# Patient Record
Sex: FEMALE | Race: WHITE | NOT HISPANIC OR LATINO | URBAN - METROPOLITAN AREA
[De-identification: names, ages, dates, MRNs, and addresses within clinical notes are randomized per-mention and may not be internally consistent; named-entity substitution may affect disease eponyms.]

---

## 2020-07-25 ENCOUNTER — TELEPHONE ENCOUNTER (OUTPATIENT)
Dept: URBAN - METROPOLITAN AREA CLINIC 13 | Facility: CLINIC | Age: 75
End: 2020-07-25

## 2020-07-25 RX ORDER — METHYLPREDNISOLONE 4 MG/1
TAKE 1 TABLET DAILY TABLET ORAL
Refills: 0 | OUTPATIENT
End: 2017-06-29

## 2020-07-25 RX ORDER — SIMVASTATIN 20 MG/1
TAKE 1 TABLET DAILY TABLET, FILM COATED ORAL
Refills: 0 | OUTPATIENT
End: 2017-08-01

## 2020-07-25 RX ORDER — OMEPRAZOLE 40 MG/1
TAKE 1 CAPSULE BY MOUTH  DAILY CAPSULE, DELAYED RELEASE ORAL
Qty: 90 | Refills: 0 | OUTPATIENT
End: 2017-08-01

## 2020-07-25 RX ORDER — POLYETHYLENE GLYCOL 3350, SODIUM CHLORIDE, SODIUM BICARBONATE AND POTASSIUM CHLORIDE WITH LEMON FLAVOR 420; 11.2; 5.72; 1.48 G/4L; G/4L; G/4L; G/4L
DRINK 8OZ GLASS EVERY 10-15 MINUTES UNTIL YOU HAVE CONSUMED YOUR FIRST HALF OF SOLUTION. REPEAT AGAIN 6 HOURS PRIOR TO PROCEDURE POWDER, FOR SOLUTION ORAL
Qty: 1 | Refills: 0 | OUTPATIENT
Start: 2017-06-26 | End: 2017-06-29

## 2020-07-25 RX ORDER — DEXAMETHASONE 4 MG/1
TAKE 1 TABLET DAILY TABLET ORAL
Refills: 0 | OUTPATIENT
End: 2017-08-01

## 2020-07-26 ENCOUNTER — TELEPHONE ENCOUNTER (OUTPATIENT)
Dept: URBAN - METROPOLITAN AREA CLINIC 13 | Facility: CLINIC | Age: 75
End: 2020-07-26

## 2020-07-26 RX ORDER — LISINOPRIL 10 MG/1
TAKE 1 TABLET DAILY TABLET ORAL
Refills: 0 | Status: ACTIVE | COMMUNITY

## 2020-07-26 RX ORDER — GLIPIZIDE 10 MG/1
TAKE 1 TABLET DAILY TABLET ORAL
Refills: 0 | Status: ACTIVE | COMMUNITY

## 2020-07-26 RX ORDER — CYANOCOBALAMIN 1000 UG/ML
INJECT 1 ML INTRAMUSCULARLY ONCE A MONTH INJECTION INTRAMUSCULAR; SUBCUTANEOUS
Refills: 0 | Status: ACTIVE | COMMUNITY

## 2020-07-26 RX ORDER — SITAGLIPTIN PHOSPHATE 100 MG
TAKE 1 TABLET DAILY TABLET ORAL
Refills: 0 | Status: ACTIVE | COMMUNITY

## 2020-07-26 RX ORDER — METFORMIN HYDROCHLORIDE 1000 MG/1
TAKE 1 TABLET EVERY 12 HOURS DAILY TABLET, COATED ORAL
Refills: 0 | Status: ACTIVE | COMMUNITY

## 2020-11-05 ENCOUNTER — TELEPHONE ENCOUNTER (OUTPATIENT)
Dept: URBAN - METROPOLITAN AREA CLINIC 113 | Facility: CLINIC | Age: 75
End: 2020-11-05

## 2020-11-09 ENCOUNTER — TELEPHONE ENCOUNTER (OUTPATIENT)
Dept: URBAN - METROPOLITAN AREA CLINIC 113 | Facility: CLINIC | Age: 75
End: 2020-11-09

## 2020-11-10 ENCOUNTER — OFFICE VISIT (OUTPATIENT)
Dept: URBAN - METROPOLITAN AREA CLINIC 113 | Facility: CLINIC | Age: 75
End: 2020-11-10
Payer: MEDICARE

## 2020-11-10 ENCOUNTER — TELEPHONE ENCOUNTER (OUTPATIENT)
Dept: URBAN - METROPOLITAN AREA CLINIC 113 | Facility: CLINIC | Age: 75
End: 2020-11-10

## 2020-11-10 VITALS
SYSTOLIC BLOOD PRESSURE: 142 MMHG | BODY MASS INDEX: 31.39 KG/M2 | HEIGHT: 67 IN | HEART RATE: 77 BPM | WEIGHT: 200 LBS | TEMPERATURE: 97.1 F | DIASTOLIC BLOOD PRESSURE: 74 MMHG

## 2020-11-10 DIAGNOSIS — D69.6 THROMBOCYTOPENIA: ICD-10-CM

## 2020-11-10 DIAGNOSIS — K55.20 ANGIODYSPLASIA: ICD-10-CM

## 2020-11-10 DIAGNOSIS — E53.8 B12 DEFICIENCY: ICD-10-CM

## 2020-11-10 DIAGNOSIS — D50.0 IRON DEFICIENCY ANEMIA DUE TO CHRONIC BLOOD LOSS: ICD-10-CM

## 2020-11-10 PROCEDURE — 84165 PROTEIN E-PHORESIS SERUM: CPT | Performed by: INTERNAL MEDICINE

## 2020-11-10 PROCEDURE — 3017F COLORECTAL CA SCREEN DOC REV: CPT | Performed by: INTERNAL MEDICINE

## 2020-11-10 PROCEDURE — 82746 ASSAY OF FOLIC ACID SERUM: CPT | Performed by: INTERNAL MEDICINE

## 2020-11-10 PROCEDURE — G9904 DOC MED RSN NO TBCO SCRN: HCPCS | Performed by: INTERNAL MEDICINE

## 2020-11-10 PROCEDURE — G8420 CALC BMI NORM PARAMETERS: HCPCS | Performed by: INTERNAL MEDICINE

## 2020-11-10 PROCEDURE — 82607 VITAMIN B-12: CPT | Performed by: INTERNAL MEDICINE

## 2020-11-10 PROCEDURE — G8427 DOCREV CUR MEDS BY ELIG CLIN: HCPCS | Performed by: INTERNAL MEDICINE

## 2020-11-10 PROCEDURE — 84155 ASSAY OF PROTEIN SERUM: CPT | Performed by: INTERNAL MEDICINE

## 2020-11-10 PROCEDURE — 99203 OFFICE O/P NEW LOW 30 MIN: CPT | Performed by: INTERNAL MEDICINE

## 2020-11-10 RX ORDER — SITAGLIPTIN PHOSPHATE 100 MG
TAKE 1 TABLET DAILY TABLET ORAL
Refills: 0 | Status: ACTIVE | COMMUNITY

## 2020-11-10 RX ORDER — FERRIC CARBOXYMALTOSE INJECTION 50 MG/ML
AS DIRECTED INJECTION, SOLUTION INTRAVENOUS
Qty: 1 | Refills: 0 | OUTPATIENT
Start: 2020-11-10

## 2020-11-10 RX ORDER — LISINOPRIL 10 MG/1
TAKE 1 TABLET DAILY TABLET ORAL
Refills: 0 | Status: ACTIVE | COMMUNITY

## 2020-11-10 RX ORDER — GLIPIZIDE 10 MG/1
TAKE 1 TABLET DAILY TABLET ORAL
Refills: 0 | Status: ACTIVE | COMMUNITY

## 2020-11-10 RX ORDER — METFORMIN HYDROCHLORIDE 1000 MG/1
TAKE 1 TABLET EVERY 12 HOURS DAILY TABLET, COATED ORAL
Refills: 0 | Status: ACTIVE | COMMUNITY

## 2020-11-10 RX ORDER — CYANOCOBALAMIN 1000 UG/ML
INJECT 1 ML INTRAMUSCULARLY ONCE A MONTH INJECTION INTRAMUSCULAR; SUBCUTANEOUS
Refills: 0 | Status: ACTIVE | COMMUNITY

## 2020-11-10 NOTE — HPI-TODAY'S VISIT:
Ms. Hernández is a 75 year old female here for long interval follow up of possible GI bleed and anemia.  She was last seen Nov 2017.   Her PCP called me last week and updated me on her clinical status.  She was also referred to Hematology.  She has a history of small bowel angiodysplasias in 2004, iron deficiency anemia, adenomatous colon polyps due surveillance in 2022, PUD related to H. pylori in 2000 s/p treatment, poorly controlled diabetes, Pituitary Tumor removed 11/2016 by Dr. Wang and reflux.   Her Hg was found to be 8 and prior Hemoglobin was around 10.  She has been more weak and tired over the last few weeks to months.  She becomes very fatigued easily.  Her  passed away in March and she has been having a bad year overall. She has been using Excedrin daily for headaches and generalized pain.  She is no longer on B12.   She is using Omeprazole daily.  She denies melena,  abdominal pain, nausea, vomiting, change in bowel habits, blood in the stool or weight loss.    Review of record Capsule endoscopy 8/1/17 revealed mild scattered small bowel erythema, no intraluminal heme visualized, the capsule did remain in the stomach for the majority of the recording, the capsule remained in the small bowel at the conclusion of the study.     KUB was performed 8/29/17 which showed no evidence of retained capsule.  It did show some mild constipation and phleboliths in her pelvis.   Previous EGD 4/10/17 revealed localized gastritis otherwise normal stomach, normal esophagus, normal duodenum and normal proximal jejunum.  No evidence of angiodysplasias or ulcer disease.  Gastric biopsies revealed chemical gastropathy, negative for H. pylori and duodenal biopsies were unremarkable.     Previous colonoscopy 6/29/17 revealed 2 tubular adenomas ranging in size from 2-3 mm resected from the rectum and sigmoid colon, normal terminal ileum, few sigmoid diverticula with mild narrowing, otherwise normal colon and small internal hemorrhoids.  Labs 10/28/2020 WBC 6.8, hemoglobin 8.2, MCV 91, platelets 92; BUN 14, creatinine 1, glucose 128, albumin 4.5, alkaline phosphatase 54, AST 15, ALT 10, T bili 0.5, TSH 1.32, hemoglobin A1c 6.7  CT abdomen pelvis 8/6/2019 revealed no acute process, normal liver, portal veins appear patent, normal spleen, normal gallbladder, moderate atherosclerosis.

## 2020-11-13 LAB
A/G RATIO: 1.2
ALBUMIN: 4.1
ALPHA-1-GLOBULIN: 0.1
ALPHA-2-GLOBULIN: 0.9
BETA GLOBULIN: 1.3
FERRITIN, SERUM: 22
FOLATE (FOLIC ACID), SERUM: 12
GAMMA GLOBULIN: 1.1
GLOBULIN, TOTAL: 3.5
HEMOGLOBIN A1C: 6.3
IRON BIND.CAP.(TIBC): 359
IRON SATURATION: 10
IRON: 36
Lab: (no result)
Lab: (no result)
M-SPIKE: (no result)
PROTEIN, TOTAL: 7.6
UIBC: 323
VITAMIN B12: 353

## 2020-11-17 ENCOUNTER — OFFICE VISIT (OUTPATIENT)
Dept: URBAN - METROPOLITAN AREA SURGERY CENTER 25 | Facility: SURGERY CENTER | Age: 75
End: 2020-11-17
Payer: MEDICARE

## 2020-11-17 ENCOUNTER — CLAIMS CREATED FROM THE CLAIM WINDOW (OUTPATIENT)
Dept: URBAN - METROPOLITAN AREA CLINIC 4 | Facility: CLINIC | Age: 75
End: 2020-11-17
Payer: MEDICARE

## 2020-11-17 ENCOUNTER — LAB OUTSIDE AN ENCOUNTER (OUTPATIENT)
Dept: URBAN - METROPOLITAN AREA CLINIC 113 | Facility: CLINIC | Age: 75
End: 2020-11-17

## 2020-11-17 ENCOUNTER — TELEPHONE ENCOUNTER (OUTPATIENT)
Dept: URBAN - METROPOLITAN AREA CLINIC 113 | Facility: CLINIC | Age: 75
End: 2020-11-17

## 2020-11-17 DIAGNOSIS — K31.7 POLYP OF STOMACH AND DUODENUM: ICD-10-CM

## 2020-11-17 DIAGNOSIS — D50.0 ANEMIA DUE TO BLOOD LOSS: ICD-10-CM

## 2020-11-17 DIAGNOSIS — K29.60 ADENOPAPILLOMATOSIS GASTRICA: ICD-10-CM

## 2020-11-17 DIAGNOSIS — K31.819 ANGIODYSPLASIA OF DUODENUM: ICD-10-CM

## 2020-11-17 DIAGNOSIS — K29.40 CHRONIC ATROPHIC GASTRITIS WITHOUT BLEEDING: ICD-10-CM

## 2020-11-17 DIAGNOSIS — K31.89 OTHER DISEASES OF STOMACH AND DUODENUM: ICD-10-CM

## 2020-11-17 PROCEDURE — 88342 IMHCHEM/IMCYTCHM 1ST ANTB: CPT | Performed by: PATHOLOGY

## 2020-11-17 PROCEDURE — G8907 PT DOC NO EVENTS ON DISCHARG: HCPCS | Performed by: INTERNAL MEDICINE

## 2020-11-17 PROCEDURE — 88305 TISSUE EXAM BY PATHOLOGIST: CPT | Performed by: PATHOLOGY

## 2020-11-17 PROCEDURE — 43270 EGD LESION ABLATION: CPT | Performed by: INTERNAL MEDICINE

## 2020-11-17 PROCEDURE — 43239 EGD BIOPSY SINGLE/MULTIPLE: CPT | Performed by: INTERNAL MEDICINE

## 2020-11-17 RX ORDER — CYANOCOBALAMIN 1000 UG/ML
INJECT 1 ML INTRAMUSCULARLY ONCE A MONTH INJECTION INTRAMUSCULAR; SUBCUTANEOUS
Refills: 0 | Status: ACTIVE | COMMUNITY

## 2020-11-17 RX ORDER — SITAGLIPTIN PHOSPHATE 100 MG
TAKE 1 TABLET DAILY TABLET ORAL
Refills: 0 | Status: ACTIVE | COMMUNITY

## 2020-11-17 RX ORDER — METFORMIN HYDROCHLORIDE 1000 MG/1
TAKE 1 TABLET EVERY 12 HOURS DAILY TABLET, COATED ORAL
Refills: 0 | Status: ACTIVE | COMMUNITY

## 2020-11-17 RX ORDER — GLIPIZIDE 10 MG/1
TAKE 1 TABLET DAILY TABLET ORAL
Refills: 0 | Status: ACTIVE | COMMUNITY

## 2020-11-17 RX ORDER — FERRIC CARBOXYMALTOSE INJECTION 50 MG/ML
AS DIRECTED INJECTION, SOLUTION INTRAVENOUS
Qty: 1 | Refills: 0 | Status: ACTIVE | COMMUNITY
Start: 2020-11-10

## 2020-11-17 RX ORDER — LISINOPRIL 10 MG/1
TAKE 1 TABLET DAILY TABLET ORAL
Refills: 0 | Status: ACTIVE | COMMUNITY

## 2020-11-23 ENCOUNTER — TELEPHONE ENCOUNTER (OUTPATIENT)
Dept: URBAN - METROPOLITAN AREA CLINIC 113 | Facility: CLINIC | Age: 75
End: 2020-11-23

## 2020-11-23 RX ORDER — LISINOPRIL 10 MG/1
TAKE 1 TABLET DAILY TABLET ORAL
Refills: 0 | Status: ACTIVE | COMMUNITY

## 2020-11-23 RX ORDER — FERRIC CARBOXYMALTOSE INJECTION 50 MG/ML
AS DIRECTED INJECTION, SOLUTION INTRAVENOUS
OUTPATIENT
Start: 2020-11-23

## 2020-11-23 RX ORDER — GLIPIZIDE 10 MG/1
TAKE 1 TABLET DAILY TABLET ORAL
Refills: 0 | Status: ACTIVE | COMMUNITY

## 2020-11-23 RX ORDER — FERRIC CARBOXYMALTOSE INJECTION 50 MG/ML
AS DIRECTED INJECTION, SOLUTION INTRAVENOUS
Qty: 1 | Refills: 0 | Status: ACTIVE | COMMUNITY
Start: 2020-11-10

## 2020-11-23 RX ORDER — SITAGLIPTIN PHOSPHATE 100 MG
TAKE 1 TABLET DAILY TABLET ORAL
Refills: 0 | Status: ACTIVE | COMMUNITY

## 2020-11-23 RX ORDER — METFORMIN HYDROCHLORIDE 1000 MG/1
TAKE 1 TABLET EVERY 12 HOURS DAILY TABLET, COATED ORAL
Refills: 0 | Status: ACTIVE | COMMUNITY

## 2020-11-23 RX ORDER — CYANOCOBALAMIN 1000 UG/ML
INJECT 1 ML INTRAMUSCULARLY ONCE A MONTH INJECTION INTRAMUSCULAR; SUBCUTANEOUS
Refills: 0 | Status: ACTIVE | COMMUNITY

## 2020-12-01 ENCOUNTER — TELEPHONE ENCOUNTER (OUTPATIENT)
Dept: URBAN - METROPOLITAN AREA CLINIC 113 | Facility: CLINIC | Age: 75
End: 2020-12-01

## 2020-12-01 ENCOUNTER — OFFICE VISIT (OUTPATIENT)
Dept: URBAN - METROPOLITAN AREA CLINIC 113 | Facility: CLINIC | Age: 75
End: 2020-12-01
Payer: MEDICARE

## 2020-12-01 ENCOUNTER — LAB OUTSIDE AN ENCOUNTER (OUTPATIENT)
Dept: URBAN - METROPOLITAN AREA CLINIC 113 | Facility: CLINIC | Age: 75
End: 2020-12-01

## 2020-12-01 VITALS
WEIGHT: 199 LBS | RESPIRATION RATE: 18 BRPM | BODY MASS INDEX: 31.23 KG/M2 | HEIGHT: 67 IN | DIASTOLIC BLOOD PRESSURE: 74 MMHG | TEMPERATURE: 97.9 F | SYSTOLIC BLOOD PRESSURE: 132 MMHG | HEART RATE: 94 BPM

## 2020-12-01 DIAGNOSIS — K55.20 ANGIODYSPLASIA: ICD-10-CM

## 2020-12-01 DIAGNOSIS — D50.0 IRON DEFICIENCY ANEMIA DUE TO CHRONIC BLOOD LOSS: ICD-10-CM

## 2020-12-01 PROCEDURE — 99213 OFFICE O/P EST LOW 20 MIN: CPT | Performed by: NURSE PRACTITIONER

## 2020-12-01 PROCEDURE — G9903 PT SCRN TBCO ID AS NON USER: HCPCS | Performed by: NURSE PRACTITIONER

## 2020-12-01 PROCEDURE — G8427 DOCREV CUR MEDS BY ELIG CLIN: HCPCS | Performed by: NURSE PRACTITIONER

## 2020-12-01 RX ORDER — ROSUVASTATIN CALCIUM 10 MG/1
1 TABLET TABLET, FILM COATED ORAL ONCE A DAY
Status: ACTIVE | COMMUNITY

## 2020-12-01 RX ORDER — METFORMIN HYDROCHLORIDE 1000 MG/1
TAKE 1 TABLET EVERY 12 HOURS DAILY TABLET, COATED ORAL
Refills: 0 | Status: ACTIVE | COMMUNITY

## 2020-12-01 RX ORDER — LISINOPRIL 10 MG/1
TAKE 1 TABLET DAILY TABLET ORAL
Refills: 0 | Status: ACTIVE | COMMUNITY

## 2020-12-01 RX ORDER — FERRIC CARBOXYMALTOSE INJECTION 50 MG/ML
AS DIRECTED INJECTION, SOLUTION INTRAVENOUS
Start: 2020-11-23

## 2020-12-01 RX ORDER — EXENATIDE 2 MG/.65ML
AS DIRECTED INJECTION, SUSPENSION, EXTENDED RELEASE SUBCUTANEOUS
Status: ACTIVE | COMMUNITY

## 2020-12-01 RX ORDER — OMEPRAZOLE 40 MG/1
1 CAPSULE 30 MINUTES BEFORE MORNING MEAL CAPSULE, DELAYED RELEASE ORAL ONCE A DAY
Status: ACTIVE | COMMUNITY

## 2020-12-01 RX ORDER — GLIPIZIDE 10 MG/1
TAKE 1 TABLET DAILY TABLET ORAL
Refills: 0 | Status: ACTIVE | COMMUNITY

## 2020-12-01 NOTE — HPI-TODAY'S VISIT:
Ms. Hernández is a 75 year old female presenting for follow up.  She has a history of small bowel angiodysplasias in 2004, iron deficiency anemia, adenomatous colon polyps due surveillance in 2022, PUD related to H. pylori in 2000 s/p treatment, poorly controlled diabetes, Pituitary Tumor removed 11/2016 by Dr. Wang and reflux.  She was last seen 11/10/2020. Her Hgb was found to be 8 and prior Hemoglobin was around 10.  She weakness and fatigue over prior weeks to months. Her  had passed away in March and she reported a bad year overall. She had been using Excedrin daily for headaches and generalized pain.  She was no longer on B12.   She was taking Omeprazole daily.  She denied melena,  abdominal pain, nausea, vomiting, change in bowel habits, blood in the stool or weight loss. EGD and labs were recommended.  She was instructed to discontinue all aNSAIDs  and continue Omeprazole.  She was referred to Dr. Maddox for a hematology evaluation and Injectafer infusions x 2 were ordered.  EGD and lab reports below. She has not received iron infusions and has not been called for an appointment with hematology.  Insurance would not cover Injectafer infusions in this office. Referral to hospital infusion center in progress. She reports feeling very fatigued to the point of exhaustion.  She admits dyspnea on exertion.  She denies chest pain.  She denies red blood per rectum or melena.  She reports 2 episodes last week during which she awakened and had dizziness with associated nausea and vomiting.  The second time this occurred, nausea persisted throughout the day and she had mild intermittent  dizziness.  She continues to report a headache every day for which she is taking Tylenol which is not as effective as Excedrin.   She is compliant with daily PPI.  She has infrequent diet dependent heartburn.  She has regular bowel movements.  Occasionally, she has 2 or 3 bowel movements in the morning that eventually become watery.  She has lab work scheduled with her primary care physician tomorrow and will follow up with her for an office visit next week.

## 2020-12-01 NOTE — HPI-OTHER HISTORIES
EGD 11/17/2020: Regular Z-line, medium size hiatal hernia, erythematous mucosa in the antrum status post biopsy, a few small nonbleeding  angioectasias x5 in the stomach treated with bipolar cautery, normal examined duodenum status post biopsy.  Duodenal biopsy demonstrated no significant abnormality.  Stomach antrum and body biopsy demonstrated chronic inactive gastritis with histological changes suggestive of treated H. pylori gastritis.  No evidence of H. pylori organisms or intestinal metaplasia, dysplasia, or malignancy.  PPI effect.   Labs  11/11/2020: Serum protein electrophoresis normal.  Iron 36, TIBC 359, iron saturation 10%.  Vitamin B12 353, folate 12.  Hemoglobin A1c 6.3. 10/28/2020 WBC 6.8, hemoglobin 8.2, MCV 91, platelets 92; BUN 14, creatinine 1, glucose 128, albumin 4.5, alkaline phosphatase 54, AST 15, ALT 10, T bili 0.5, TSH 1.32, hemoglobin A1c 6.7  CT abdomen and pelvis 8/6/2019: no acute process. Normal liver, portal veins appear patent, normal spleen, normal gallbladder, moderate atherosclerosis. Capsule endoscopy 8/1/17 revealed mild scattered small bowel erythema, no intraluminal heme visualized, the capsule did remain in the stomach for the majority of the recording, the capsule remained in the small bowel at the conclusion of the study.     KUB was performed 8/29/17 which showed no evidence of retained capsule.  It did show some mild constipation and phleboliths in her pelvis.   Previous EGD 4/10/17 revealed localized gastritis otherwise normal stomach, normal esophagus, normal duodenum and normal proximal jejunum.  No evidence of angiodysplasias or ulcer disease.  Gastric biopsies revealed chemical gastropathy, negative for H. pylori and duodenal biopsies were unremarkable.     Previous colonoscopy 6/29/17 revealed 2 tubular adenomas ranging in size from 2-3 mm resected from the rectum and sigmoid colon, normal terminal ileum, few sigmoid diverticula with mild narrowing, otherwise normal colon and small internal hemorrhoids.  10/28/2020 WBC 6.8, hemoglobin 8.2, MCV 91, platelets 92; BUN 14, creatinine 1, glucose 128, albumin 4.5, alkaline phosphatase 54, AST 15, ALT 10, T bili 0.5, TSH 1.32, hemoglobin A1c 6.7

## 2021-02-01 ENCOUNTER — OFFICE VISIT (OUTPATIENT)
Dept: URBAN - METROPOLITAN AREA CLINIC 113 | Facility: CLINIC | Age: 76
End: 2021-02-01

## 2021-03-24 ENCOUNTER — WEB ENCOUNTER (OUTPATIENT)
Dept: URBAN - METROPOLITAN AREA CLINIC 113 | Facility: CLINIC | Age: 76
End: 2021-03-24

## 2021-03-24 ENCOUNTER — OFFICE VISIT (OUTPATIENT)
Dept: URBAN - METROPOLITAN AREA CLINIC 113 | Facility: CLINIC | Age: 76
End: 2021-03-24
Payer: MEDICARE

## 2021-03-24 VITALS
WEIGHT: 195 LBS | DIASTOLIC BLOOD PRESSURE: 77 MMHG | HEIGHT: 67 IN | BODY MASS INDEX: 30.61 KG/M2 | SYSTOLIC BLOOD PRESSURE: 131 MMHG | HEART RATE: 106 BPM | TEMPERATURE: 98.2 F | RESPIRATION RATE: 20 BRPM

## 2021-03-24 DIAGNOSIS — D50.0 IRON DEFICIENCY ANEMIA DUE TO CHRONIC BLOOD LOSS: ICD-10-CM

## 2021-03-24 DIAGNOSIS — H81.03 MENIERE'S DISEASE OF BOTH EARS: ICD-10-CM

## 2021-03-24 DIAGNOSIS — D69.6 THROMBOCYTOPENIA: ICD-10-CM

## 2021-03-24 DIAGNOSIS — E53.8 B12 DEFICIENCY: ICD-10-CM

## 2021-03-24 DIAGNOSIS — K55.20 ANGIODYSPLASIA: ICD-10-CM

## 2021-03-24 DIAGNOSIS — D50.8 ANEMIA, DUE TO INADEQUATE IRON INTAKE: ICD-10-CM

## 2021-03-24 PROBLEM — 415116008: Status: ACTIVE | Noted: 2020-11-10

## 2021-03-24 PROBLEM — 190634004: Status: ACTIVE | Noted: 2020-11-10

## 2021-03-24 PROBLEM — 371315009: Status: ACTIVE | Noted: 2020-11-23

## 2021-03-24 PROBLEM — 235853006: Status: ACTIVE | Noted: 2020-11-10

## 2021-03-24 PROBLEM — 724556004: Status: ACTIVE | Noted: 2020-11-10

## 2021-03-24 PROCEDURE — 99214 OFFICE O/P EST MOD 30 MIN: CPT | Performed by: INTERNAL MEDICINE

## 2021-03-24 RX ORDER — FERRIC CARBOXYMALTOSE INJECTION 50 MG/ML
AS DIRECTED INJECTION, SOLUTION INTRAVENOUS
Status: ACTIVE | COMMUNITY
Start: 2020-11-23

## 2021-03-24 RX ORDER — OMEPRAZOLE 40 MG/1
1 CAPSULE 30 MINUTES BEFORE MORNING MEAL CAPSULE, DELAYED RELEASE ORAL ONCE A DAY
Status: ACTIVE | COMMUNITY

## 2021-03-24 RX ORDER — GLIPIZIDE 10 MG/1
TAKE 1 TABLET DAILY TABLET ORAL
Refills: 0 | Status: ACTIVE | COMMUNITY

## 2021-03-24 RX ORDER — LISINOPRIL 10 MG/1
TAKE 1 TABLET DAILY TABLET ORAL
Refills: 0 | Status: ON HOLD | COMMUNITY

## 2021-03-24 RX ORDER — TRIAMTERENE AND HYDROCHLOROTHIAZIDE 37.5; 25 MG/1; MG/1
1 TABLET IN THE MORNING TABLET ORAL ONCE A DAY
Status: ACTIVE | COMMUNITY

## 2021-03-24 RX ORDER — METFORMIN HYDROCHLORIDE 1000 MG/1
TAKE 1 TABLET EVERY 12 HOURS DAILY TABLET, COATED ORAL
Refills: 0 | Status: ACTIVE | COMMUNITY

## 2021-03-24 RX ORDER — ONDANSETRON 4 MG/1
1-2 TABLETS ON THE TONGUE AND ALLOW TO DISSOLVE TABLET, ORALLY DISINTEGRATING ORAL
Status: ACTIVE | COMMUNITY

## 2021-03-24 RX ORDER — EXENATIDE 2 MG/.65ML
AS DIRECTED INJECTION, SUSPENSION, EXTENDED RELEASE SUBCUTANEOUS
Status: ACTIVE | COMMUNITY

## 2021-03-24 RX ORDER — ROSUVASTATIN CALCIUM 10 MG/1
1 TABLET TABLET, FILM COATED ORAL ONCE A DAY
Status: ACTIVE | COMMUNITY

## 2021-03-24 NOTE — HPI-TODAY'S VISIT:
Ms. Hernández is a 75 year old female With a history of iron deficiency anemia, B12 deficiency and thrombocytopenia presenting for routine follow up.  She had routine labs with her PCP earlier this month which are below.  Hemoglobin was 9.7 And platelets are 127.  She has a follow-up appointment with hematology next week.  She had iron infusions earlier this year in Feb with Dr. Maddox. She has not been on iron supplementation since then.  She also has not been on B12 injection for more than a year.  Overall she feels pretty well.  She still feels tired and fatigued.  She does have some shortness of breath when exerting herself.  She does complain of dizziness and episodes of vomiting.  She was referred to ENT, Dr. Cabrera and was diagnosed with Meniers disease.  She was given Triamterene and HCTZ.  She was also given ODT zofran as needed which helps with the nausea.  She also uses Dramamine as needed, but almost daily.  It does make her drowsy.  She denies  abdominal pain, blood in stool, hematemesis, weight loss or change in bowel habits.      labs 3/2/21 WBC 11, HG 9.7, MCV 98, ; Iron 56, TIBC 279, Ferritin 172.   EGD 11/17/2020: Regular Z-line, medium size hiatal hernia, erythematous mucosa in the antrum status post biopsy, a few small nonbleeding  angioectasias x5 in the stomach treated with bipolar cautery, normal examined duodenum status post biopsy.  Duodenal biopsy demonstrated no significant abnormality.  Stomach antrum and body biopsy demonstrated chronic inactive gastritis with histological changes suggestive of treated H. pylori gastritis.  No evidence of H. pylori organisms or intestinal metaplasia, dysplasia, or malignancy.  PPI effect.

## 2021-03-24 NOTE — HPI-OTHER HISTORIES
Labs  11/11/2020: Serum protein electrophoresis normal.  Iron 36, TIBC 359, iron saturation 10%.  Vitamin B12 353, folate 12.  Hemoglobin A1c 6.3. 10/28/2020 WBC 6.8, hemoglobin 8.2, MCV 91, platelets 92; BUN 14, creatinine 1, glucose 128, albumin 4.5, alkaline phosphatase 54, AST 15, ALT 10, T bili 0.5, TSH 1.32, hemoglobin A1c 6.7  CT abdomen and pelvis 8/6/2019: no acute process. Normal liver, portal veins appear patent, normal spleen, normal gallbladder, moderate atherosclerosis. Capsule endoscopy 8/1/17 revealed mild scattered small bowel erythema, no intraluminal heme visualized, the capsule did remain in the stomach for the majority of the recording, the capsule remained in the small bowel at the conclusion of the study.     KUB was performed 8/29/17 which showed no evidence of retained capsule.  It did show some mild constipation and phleboliths in her pelvis.   Previous EGD 4/10/17 revealed localized gastritis otherwise normal stomach, normal esophagus, normal duodenum and normal proximal jejunum.  No evidence of angiodysplasias or ulcer disease.  Gastric biopsies revealed chemical gastropathy, negative for H. pylori and duodenal biopsies were unremarkable.     Previous colonoscopy 6/29/17 revealed 2 tubular adenomas ranging in size from 2-3 mm resected from the rectum and sigmoid colon, normal terminal ileum, few sigmoid diverticula with mild narrowing, otherwise normal colon and small internal hemorrhoids.  10/28/2020 WBC 6.8, hemoglobin 8.2, MCV 91, platelets 92; BUN 14, creatinine 1, glucose 128, albumin 4.5, alkaline phosphatase 54, AST 15, ALT 10, T bili 0.5, TSH 1.32, hemoglobin A1c 6.7

## 2024-04-30 ENCOUNTER — LAB (OUTPATIENT)
Dept: URBAN - METROPOLITAN AREA CLINIC 113 | Facility: CLINIC | Age: 79
End: 2024-04-30

## 2024-04-30 ENCOUNTER — OV CON (OUTPATIENT)
Dept: URBAN - METROPOLITAN AREA CLINIC 113 | Facility: CLINIC | Age: 79
End: 2024-04-30

## 2024-04-30 VITALS
TEMPERATURE: 97.7 F | SYSTOLIC BLOOD PRESSURE: 118 MMHG | WEIGHT: 187.8 LBS | RESPIRATION RATE: 20 BRPM | HEIGHT: 67 IN | HEART RATE: 68 BPM | DIASTOLIC BLOOD PRESSURE: 64 MMHG | BODY MASS INDEX: 29.47 KG/M2

## 2024-04-30 RX ORDER — FERRIC CARBOXYMALTOSE INJECTION 50 MG/ML
AS DIRECTED INJECTION, SOLUTION INTRAVENOUS
Status: ON HOLD | COMMUNITY
Start: 2020-11-23

## 2024-04-30 RX ORDER — HYDROXYUREA 500 MG/1
1 CAPSULE CAPSULE ORAL TWICE A DAY
Status: ACTIVE | COMMUNITY

## 2024-04-30 RX ORDER — FAMOTIDINE 20 MG/1
1 TABLET AT BEDTIME AS NEEDED TABLET, FILM COATED ORAL ONCE A DAY
Status: ACTIVE | COMMUNITY

## 2024-04-30 RX ORDER — LISINOPRIL 10 MG/1
TAKE 1 TABLET DAILY TABLET ORAL
Refills: 0 | Status: ON HOLD | COMMUNITY

## 2024-04-30 RX ORDER — ROSUVASTATIN CALCIUM 10 MG/1
1 TABLET TABLET, FILM COATED ORAL ONCE A DAY
Status: ON HOLD | COMMUNITY

## 2024-04-30 RX ORDER — EXENATIDE 2 MG/.65ML
AS DIRECTED INJECTION, SUSPENSION, EXTENDED RELEASE SUBCUTANEOUS
Status: ACTIVE | COMMUNITY

## 2024-04-30 RX ORDER — ASPIRIN 81 MG/1
1 TABLET TABLET, COATED ORAL ONCE A DAY
Status: ACTIVE | COMMUNITY

## 2024-04-30 RX ORDER — TRIAMTERENE AND HYDROCHLOROTHIAZIDE 37.5; 25 MG/1; MG/1
1 TABLET IN THE MORNING TABLET ORAL ONCE A DAY
Status: ACTIVE | COMMUNITY

## 2024-04-30 RX ORDER — METFORMIN HYDROCHLORIDE 1000 MG/1
1 TABLET WITH A MEAL TABLET, COATED ORAL ONCE A DAY
Refills: 0 | Status: ACTIVE | COMMUNITY

## 2024-04-30 RX ORDER — GLIPIZIDE 10 MG/1
1 TABLET 30 MINUTES BEFORE BREAKFAST TABLET ORAL ONCE A DAY
Refills: 0 | Status: ACTIVE | COMMUNITY

## 2024-04-30 RX ORDER — ONDANSETRON 4 MG/1
1-2 TABLETS ON THE TONGUE AND ALLOW TO DISSOLVE TABLET, ORALLY DISINTEGRATING ORAL
Status: ACTIVE | COMMUNITY

## 2024-04-30 RX ORDER — OMEPRAZOLE 40 MG/1
1 CAPSULE 30 MINUTES BEFORE MORNING MEAL CAPSULE, DELAYED RELEASE ORAL ONCE A DAY
Status: ON HOLD | COMMUNITY

## 2024-04-30 NOTE — HPI-TODAY'S VISIT:
Ms. Hernández is a 78 year old female with a history of diabetes, coronary artery disease s/p MI previously on Plavix, CMML, pituitary macroadenoma, iron deficiency anemia, B12 deficiency and thrombocytopenia presenting to discuss colonoscopy. She was last seen in the office in March 2021 by Dr. Yao for follow-up regarding anemia secondary to iron deficiency and B12, status post IV iron and B12 injections.  Most recent hemoglobin was stable at 9.  She was to continue close follow-up with hematology.  Previous EGD revealed a few small nonbleeding angiectasia status post coagulation; repeat enteroscopy was recommended as needed.  Overall, she is doing fairly well from a GI standpoint. She has a daily formed stool, with exacerbations of diarrhea about every 2 weeks. During these episodes, she will have multiple loose, urgent stools throughout the day and some nighttime bowel movements. She denies any blood in the stool. She is not on a bowel regimen and does not utilize antidiarrheals. She denies abdominal pain, nausea or vomiting. Labs 4/25/2024:H/H 9/27.4, , PLT 68, WBC 8.2. Labs 2/22/2024:H/H 8.6/25.8, , PLT 67, WBC 6.3.  CMP unremarkable aside from glucose 149, BUN/creat 17/1.12.  Iron 59, TIBC 268, iron sat 22.  Ferritin 160.  TSH 2.17.

## 2024-04-30 NOTE — HPI-OTHER HISTORIES
labs 3/2/21 WBC 11, HG 9.7, MCV 98, ; Iron 56, TIBC 279, Ferritin 172.  EGD 11/17/2020: Regular Z-line, medium size hiatal hernia, erythematous mucosa in the antrum status post biopsy, a few small nonbleeding angioectasias x5 in the stomach treated with bipolar cautery, normal examined duodenum status post biopsy. Duodenal biopsy demonstrated no significant abnormality. Stomach antrum and body biopsy demonstrated chronic inactive gastritis with histological changes suggestive of treated H. pylori gastritis. No evidence of H. pylori organisms or intestinal metaplasia, dysplasia, or malignancy. PPI effect.  Labs  11/11/2020: Serum protein electrophoresis normal.  Iron 36, TIBC 359, iron saturation 10%.  Vitamin B12 353, folate 12.  Hemoglobin A1c 6.3. 10/28/2020 WBC 6.8, hemoglobin 8.2, MCV 91, platelets 92; BUN 14, creatinine 1, glucose 128, albumin 4.5, alkaline phosphatase 54, AST 15, ALT 10, T bili 0.5, TSH 1.32, hemoglobin A1c 6.7  CT abdomen and pelvis 8/6/2019: no acute process. Normal liver, portal veins appear patent, normal spleen, normal gallbladder, moderate atherosclerosis. Capsule endoscopy 8/1/17 revealed mild scattered small bowel erythema, no intraluminal heme visualized, the capsule did remain in the stomach for the majority of the recording, the capsule remained in the small bowel at the conclusion of the study.     KUB was performed 8/29/17 which showed no evidence of retained capsule.  It did show some mild constipation and phleboliths in her pelvis.   Previous EGD 4/10/17 revealed localized gastritis otherwise normal stomach, normal esophagus, normal duodenum and normal proximal jejunum.  No evidence of angiodysplasias or ulcer disease.  Gastric biopsies revealed chemical gastropathy, negative for H. pylori and duodenal biopsies were unremarkable.     Previous colonoscopy 6/29/17 revealed 2 tubular adenomas ranging in size from 2-3 mm resected from the rectum and sigmoid colon, normal terminal ileum, few sigmoid diverticula with mild narrowing, otherwise normal colon and small internal hemorrhoids.  10/28/2020 WBC 6.8, hemoglobin 8.2, MCV 91, platelets 92; BUN 14, creatinine 1, glucose 128, albumin 4.5, alkaline phosphatase 54, AST 15, ALT 10, T bili 0.5, TSH 1.32, hemoglobin A1c 6.7

## 2024-05-06 ENCOUNTER — TELEPHONE ENCOUNTER (OUTPATIENT)
Dept: URBAN - METROPOLITAN AREA CLINIC 113 | Facility: CLINIC | Age: 79
End: 2024-05-06

## 2024-05-06 ENCOUNTER — LAB OUTSIDE AN ENCOUNTER (OUTPATIENT)
Dept: URBAN - METROPOLITAN AREA CLINIC 113 | Facility: CLINIC | Age: 79
End: 2024-05-06

## 2024-05-06 ENCOUNTER — CLAIMS CREATED FROM THE CLAIM WINDOW (OUTPATIENT)
Dept: URBAN - METROPOLITAN AREA SURGERY CENTER 25 | Facility: SURGERY CENTER | Age: 79
End: 2024-05-06
Payer: MEDICARE

## 2024-05-06 DIAGNOSIS — Z86.010 PERSONAL HISTORY OF COLONIC POLYPS: ICD-10-CM

## 2024-05-06 DIAGNOSIS — Q43.8 OTHER SPECIFIED CONGENITAL MALFORMATIONS OF INTESTINE: ICD-10-CM

## 2024-05-06 DIAGNOSIS — Z09 ENCNTR FOR F/U EXAM AFT TRTMT FOR COND OTH THAN MALIG NEOPLM: ICD-10-CM

## 2024-05-06 DIAGNOSIS — Z09 ENCOUNTER FOR FOLLOW-UP EXAMINATION AFTER COMPLETED TREATMENT FOR CONDITIONS OTHER THAN MALIGNANT NEOPLASM: ICD-10-CM

## 2024-05-06 DIAGNOSIS — K57.30 COLON, DIVERTICULOSIS: ICD-10-CM

## 2024-05-06 DIAGNOSIS — Z86.010 ADENOMAS PERSONAL HISTORY OF COLONIC POLYPS: ICD-10-CM

## 2024-05-06 PROCEDURE — G0105 COLORECTAL SCRN; HI RISK IND: HCPCS | Performed by: INTERNAL MEDICINE

## 2024-05-06 PROCEDURE — G8907 PT DOC NO EVENTS ON DISCHARG: HCPCS | Performed by: CLINIC/CENTER

## 2024-05-06 PROCEDURE — G0105 COLORECTAL SCRN; HI RISK IND: HCPCS | Performed by: CLINIC/CENTER

## 2024-05-06 PROCEDURE — 00811 ANES LWR INTST NDSC NOS: CPT | Performed by: ANESTHESIOLOGIST ASSISTANT

## 2024-05-06 PROCEDURE — 00811 ANES LWR INTST NDSC NOS: CPT | Performed by: ANESTHESIOLOGY

## 2024-05-06 RX ORDER — METFORMIN HYDROCHLORIDE 1000 MG/1
1 TABLET WITH A MEAL TABLET, COATED ORAL ONCE A DAY
Refills: 0 | Status: ACTIVE | COMMUNITY

## 2024-05-06 RX ORDER — FAMOTIDINE 20 MG/1
1 TABLET AT BEDTIME AS NEEDED TABLET, FILM COATED ORAL ONCE A DAY
Status: ACTIVE | COMMUNITY

## 2024-05-06 RX ORDER — EXENATIDE 2 MG/.65ML
AS DIRECTED INJECTION, SUSPENSION, EXTENDED RELEASE SUBCUTANEOUS
Status: ACTIVE | COMMUNITY

## 2024-05-06 RX ORDER — FERRIC CARBOXYMALTOSE INJECTION 50 MG/ML
AS DIRECTED INJECTION, SOLUTION INTRAVENOUS
Status: ON HOLD | COMMUNITY
Start: 2020-11-23

## 2024-05-06 RX ORDER — ROSUVASTATIN 10 MG/1
1 TABLET TABLET, FILM COATED ORAL ONCE A DAY
Status: ON HOLD | COMMUNITY

## 2024-05-06 RX ORDER — ASPIRIN 81 MG/1
1 TABLET TABLET, COATED ORAL ONCE A DAY
Status: ACTIVE | COMMUNITY

## 2024-05-06 RX ORDER — TRIAMTERENE AND HYDROCHLOROTHIAZIDE 37.5; 25 MG/1; MG/1
1 TABLET IN THE MORNING TABLET ORAL ONCE A DAY
Status: ACTIVE | COMMUNITY

## 2024-05-06 RX ORDER — GLIPIZIDE 10 MG/1
1 TABLET 30 MINUTES BEFORE BREAKFAST TABLET ORAL ONCE A DAY
Refills: 0 | Status: ACTIVE | COMMUNITY

## 2024-05-06 RX ORDER — LISINOPRIL 10 MG/1
TAKE 1 TABLET DAILY TABLET ORAL
Refills: 0 | Status: ON HOLD | COMMUNITY

## 2024-05-06 RX ORDER — OMEPRAZOLE 40 MG/1
1 CAPSULE 30 MINUTES BEFORE MORNING MEAL CAPSULE, DELAYED RELEASE ORAL ONCE A DAY
Status: ON HOLD | COMMUNITY

## 2024-05-06 RX ORDER — ONDANSETRON 4 MG/1
1-2 TABLETS ON THE TONGUE AND ALLOW TO DISSOLVE TABLET, ORALLY DISINTEGRATING ORAL
Status: ACTIVE | COMMUNITY

## 2024-05-06 RX ORDER — HYDROXYUREA 500 MG/1
1 CAPSULE CAPSULE ORAL TWICE A DAY
Status: ACTIVE | COMMUNITY

## 2024-05-30 ENCOUNTER — OFFICE VISIT (OUTPATIENT)
Dept: URBAN - METROPOLITAN AREA CLINIC 113 | Facility: CLINIC | Age: 79
End: 2024-05-30
Payer: MEDICARE

## 2024-05-30 ENCOUNTER — CLAIMS CREATED FROM THE CLAIM WINDOW (OUTPATIENT)
Dept: URBAN - METROPOLITAN AREA CLINIC 113 | Facility: CLINIC | Age: 79
End: 2024-05-30

## 2024-05-30 ENCOUNTER — DASHBOARD ENCOUNTERS (OUTPATIENT)
Age: 79
End: 2024-05-30

## 2024-05-30 VITALS
HEART RATE: 81 BPM | TEMPERATURE: 98.1 F | HEIGHT: 67 IN | RESPIRATION RATE: 20 BRPM | BODY MASS INDEX: 29.44 KG/M2 | SYSTOLIC BLOOD PRESSURE: 119 MMHG | WEIGHT: 187.6 LBS | DIASTOLIC BLOOD PRESSURE: 61 MMHG

## 2024-05-30 DIAGNOSIS — Z86.010 HISTORY OF ADENOMATOUS POLYP OF COLON: ICD-10-CM

## 2024-05-30 DIAGNOSIS — K21.9 GERD: ICD-10-CM

## 2024-05-30 DIAGNOSIS — D50.0 IRON DEFICIENCY ANEMIA DUE TO CHRONIC BLOOD LOSS: ICD-10-CM

## 2024-05-30 PROCEDURE — 99214 OFFICE O/P EST MOD 30 MIN: CPT | Performed by: NURSE PRACTITIONER

## 2024-05-30 RX ORDER — GLIPIZIDE 10 MG/1
1 TABLET 30 MINUTES BEFORE BREAKFAST TABLET ORAL ONCE A DAY
Refills: 0 | Status: ACTIVE | COMMUNITY

## 2024-05-30 RX ORDER — TRIAMTERENE AND HYDROCHLOROTHIAZIDE 37.5; 25 MG/1; MG/1
1 TABLET IN THE MORNING TABLET ORAL ONCE A DAY
Status: ACTIVE | COMMUNITY

## 2024-05-30 RX ORDER — OMEPRAZOLE 40 MG/1
1 CAPSULE 30 MINUTES BEFORE MORNING MEAL CAPSULE, DELAYED RELEASE ORAL ONCE A DAY
Status: ON HOLD | COMMUNITY

## 2024-05-30 RX ORDER — ONDANSETRON 4 MG/1
1-2 TABLETS ON THE TONGUE AND ALLOW TO DISSOLVE TABLET, ORALLY DISINTEGRATING ORAL
Status: ACTIVE | COMMUNITY

## 2024-05-30 RX ORDER — ASPIRIN 81 MG/1
1 TABLET TABLET, COATED ORAL ONCE A DAY
Status: ACTIVE | COMMUNITY

## 2024-05-30 RX ORDER — FAMOTIDINE 20 MG/1
1 TABLET AT BEDTIME AS NEEDED TABLET, FILM COATED ORAL ONCE A DAY
Status: ACTIVE | COMMUNITY

## 2024-05-30 RX ORDER — LISINOPRIL 10 MG/1
TAKE 1 TABLET DAILY TABLET ORAL
Refills: 0 | Status: ON HOLD | COMMUNITY

## 2024-05-30 RX ORDER — HYDROXYUREA 500 MG/1
1 CAPSULE CAPSULE ORAL TWICE A DAY
Status: ACTIVE | COMMUNITY

## 2024-05-30 RX ORDER — ROSUVASTATIN 10 MG/1
1 TABLET TABLET, FILM COATED ORAL ONCE A DAY
Status: ON HOLD | COMMUNITY

## 2024-05-30 RX ORDER — METFORMIN HYDROCHLORIDE 1000 MG/1
1 TABLET WITH A MEAL TABLET, COATED ORAL ONCE A DAY
Refills: 0 | Status: ACTIVE | COMMUNITY

## 2024-05-30 RX ORDER — PANTOPRAZOLE 40 MG/1
1 TABLET TABLET, DELAYED RELEASE ORAL ONCE A DAY
Qty: 90 TABLET | Refills: 3 | OUTPATIENT
Start: 2024-05-30

## 2024-05-30 RX ORDER — FERRIC CARBOXYMALTOSE INJECTION 50 MG/ML
AS DIRECTED INJECTION, SOLUTION INTRAVENOUS
Status: ON HOLD | COMMUNITY
Start: 2020-11-23

## 2024-05-30 RX ORDER — EXENATIDE 2 MG/.65ML
AS DIRECTED INJECTION, SUSPENSION, EXTENDED RELEASE SUBCUTANEOUS
Status: ACTIVE | COMMUNITY

## 2024-06-12 ENCOUNTER — OFFICE VISIT (OUTPATIENT)
Dept: URBAN - METROPOLITAN AREA MEDICAL CENTER 19 | Facility: MEDICAL CENTER | Age: 79
End: 2024-06-12
Payer: MEDICARE

## 2024-06-12 ENCOUNTER — TELEPHONE ENCOUNTER (OUTPATIENT)
Dept: URBAN - METROPOLITAN AREA CLINIC 113 | Facility: CLINIC | Age: 79
End: 2024-06-12

## 2024-06-12 DIAGNOSIS — K31.819 ACQUIRED ARTERIOVENOUS MALFORMATION OF DUODENUM: ICD-10-CM

## 2024-06-12 DIAGNOSIS — K92.2 ACUTE GASTROINTESTINAL BLEEDING: ICD-10-CM

## 2024-06-12 DIAGNOSIS — K31.A13 INTESTINAL METAPLASIA OF FUNDUS OF STOMACH WITHOUT DYSPLASIA: ICD-10-CM

## 2024-06-12 PROCEDURE — 44361 SMALL BOWEL ENDOSCOPY/BIOPSY: CPT | Performed by: INTERNAL MEDICINE

## 2024-06-12 PROCEDURE — 44369 SMALL BOWEL ENDOSCOPY: CPT | Performed by: INTERNAL MEDICINE

## 2024-06-12 RX ORDER — TRIAMTERENE AND HYDROCHLOROTHIAZIDE 37.5; 25 MG/1; MG/1
1 TABLET IN THE MORNING TABLET ORAL ONCE A DAY
Status: ACTIVE | COMMUNITY

## 2024-06-12 RX ORDER — METFORMIN HYDROCHLORIDE 1000 MG/1
1 TABLET WITH A MEAL TABLET, COATED ORAL ONCE A DAY
Refills: 0 | Status: ACTIVE | COMMUNITY

## 2024-06-12 RX ORDER — ONDANSETRON 4 MG/1
1-2 TABLETS ON THE TONGUE AND ALLOW TO DISSOLVE TABLET, ORALLY DISINTEGRATING ORAL
Status: ACTIVE | COMMUNITY

## 2024-06-12 RX ORDER — OMEPRAZOLE 40 MG/1
1 CAPSULE 30 MINUTES BEFORE MORNING MEAL CAPSULE, DELAYED RELEASE ORAL ONCE A DAY
Status: ON HOLD | COMMUNITY

## 2024-06-12 RX ORDER — EXENATIDE 2 MG/.65ML
AS DIRECTED INJECTION, SUSPENSION, EXTENDED RELEASE SUBCUTANEOUS
Status: ACTIVE | COMMUNITY

## 2024-06-12 RX ORDER — PANTOPRAZOLE 40 MG/1
1 TABLET TABLET, DELAYED RELEASE ORAL ONCE A DAY
Qty: 90 TABLET | Refills: 3 | Status: ACTIVE | COMMUNITY
Start: 2024-05-30

## 2024-06-12 RX ORDER — ASPIRIN 81 MG/1
1 TABLET TABLET, COATED ORAL ONCE A DAY
Status: ACTIVE | COMMUNITY

## 2024-06-12 RX ORDER — FERRIC CARBOXYMALTOSE INJECTION 50 MG/ML
AS DIRECTED INJECTION, SOLUTION INTRAVENOUS
Status: ON HOLD | COMMUNITY
Start: 2020-11-23

## 2024-06-12 RX ORDER — HYDROXYUREA 500 MG/1
1 CAPSULE CAPSULE ORAL TWICE A DAY
Status: ACTIVE | COMMUNITY

## 2024-06-12 RX ORDER — LISINOPRIL 10 MG/1
TAKE 1 TABLET DAILY TABLET ORAL
Refills: 0 | Status: ON HOLD | COMMUNITY

## 2024-06-12 RX ORDER — FAMOTIDINE 20 MG/1
1 TABLET AT BEDTIME AS NEEDED TABLET, FILM COATED ORAL ONCE A DAY
Status: ACTIVE | COMMUNITY

## 2024-06-12 RX ORDER — ROSUVASTATIN 10 MG/1
1 TABLET TABLET, FILM COATED ORAL ONCE A DAY
Status: ON HOLD | COMMUNITY

## 2024-06-12 RX ORDER — GLIPIZIDE 10 MG/1
1 TABLET 30 MINUTES BEFORE BREAKFAST TABLET ORAL ONCE A DAY
Refills: 0 | Status: ACTIVE | COMMUNITY

## 2024-08-08 ENCOUNTER — OFFICE VISIT (OUTPATIENT)
Dept: URBAN - METROPOLITAN AREA CLINIC 113 | Facility: CLINIC | Age: 79
End: 2024-08-08
Payer: MEDICARE

## 2024-08-08 VITALS
SYSTOLIC BLOOD PRESSURE: 134 MMHG | TEMPERATURE: 97.3 F | HEART RATE: 71 BPM | RESPIRATION RATE: 20 BRPM | WEIGHT: 186 LBS | BODY MASS INDEX: 29.19 KG/M2 | DIASTOLIC BLOOD PRESSURE: 65 MMHG | HEIGHT: 67 IN

## 2024-08-08 DIAGNOSIS — D69.6 THROMBOCYTOPENIA: ICD-10-CM

## 2024-08-08 DIAGNOSIS — K21.9 GASTRO-ESOPHAGEAL REFLUX DISEASE WITHOUT ESOPHAGITIS: ICD-10-CM

## 2024-08-08 DIAGNOSIS — K31.819 ANGIODYSPLASIA OF STOMACH AND DUODENUM WITHOUT BLEEDING: ICD-10-CM

## 2024-08-08 DIAGNOSIS — D50.0 IRON DEFICIENCY ANEMIA DUE TO CHRONIC BLOOD LOSS: ICD-10-CM

## 2024-08-08 PROCEDURE — 99214 OFFICE O/P EST MOD 30 MIN: CPT | Performed by: NURSE PRACTITIONER

## 2024-08-08 RX ORDER — FAMOTIDINE 20 MG/1
1 TABLET AT BEDTIME AS NEEDED TABLET, FILM COATED ORAL ONCE A DAY
Status: ACTIVE | COMMUNITY

## 2024-08-08 RX ORDER — TRIAMTERENE AND HYDROCHLOROTHIAZIDE 37.5; 25 MG/1; MG/1
1 TABLET IN THE MORNING TABLET ORAL ONCE A DAY
Status: ACTIVE | COMMUNITY

## 2024-08-08 RX ORDER — GLIPIZIDE 10 MG/1
1 TABLET 30 MINUTES BEFORE BREAKFAST TABLET ORAL ONCE A DAY
Refills: 0 | Status: ACTIVE | COMMUNITY

## 2024-08-08 RX ORDER — FERRIC CARBOXYMALTOSE INJECTION 50 MG/ML
AS DIRECTED INJECTION, SOLUTION INTRAVENOUS
Status: ON HOLD | COMMUNITY
Start: 2020-11-23

## 2024-08-08 RX ORDER — PANTOPRAZOLE 40 MG/1
1 TABLET TABLET, DELAYED RELEASE ORAL ONCE A DAY
OUTPATIENT
Start: 2024-05-30

## 2024-08-08 RX ORDER — HYDROXYUREA 500 MG/1
1 CAPSULE CAPSULE ORAL TWICE A DAY
Status: ACTIVE | COMMUNITY

## 2024-08-08 RX ORDER — METFORMIN HYDROCHLORIDE 1000 MG/1
1 TABLET WITH A MEAL TABLET, COATED ORAL ONCE A DAY
Refills: 0 | Status: ACTIVE | COMMUNITY

## 2024-08-08 RX ORDER — PANTOPRAZOLE 40 MG/1
1 TABLET TABLET, DELAYED RELEASE ORAL ONCE A DAY
Qty: 90 TABLET | Refills: 3 | Status: ACTIVE | COMMUNITY
Start: 2024-05-30

## 2024-08-08 RX ORDER — ONDANSETRON 4 MG/1
1-2 TABLETS ON THE TONGUE AND ALLOW TO DISSOLVE TABLET, ORALLY DISINTEGRATING ORAL
Status: ACTIVE | COMMUNITY

## 2024-08-08 RX ORDER — EXENATIDE 2 MG/.65ML
AS DIRECTED INJECTION, SUSPENSION, EXTENDED RELEASE SUBCUTANEOUS
Status: ACTIVE | COMMUNITY

## 2024-08-08 RX ORDER — LISINOPRIL 10 MG/1
TAKE 1 TABLET DAILY TABLET ORAL
Refills: 0 | Status: ON HOLD | COMMUNITY

## 2024-08-08 RX ORDER — ROSUVASTATIN 10 MG/1
1 TABLET TABLET, FILM COATED ORAL ONCE A DAY
Status: ON HOLD | COMMUNITY

## 2024-08-08 RX ORDER — ASPIRIN 81 MG/1
1 TABLET TABLET, COATED ORAL ONCE A DAY
Status: ACTIVE | COMMUNITY

## 2024-08-08 RX ORDER — OMEPRAZOLE 40 MG/1
1 CAPSULE 30 MINUTES BEFORE MORNING MEAL CAPSULE, DELAYED RELEASE ORAL ONCE A DAY
Status: ON HOLD | COMMUNITY

## 2024-08-08 NOTE — HPI-TODAY'S VISIT:
Ms. Hernández is a 78 year old female with a history of diabetes, coronary artery disease s/p MI previously on Plavix, CMML, pituitary macroadenoma, iron deficiency anemia, B12 deficiency and thrombocytopenia presenting for follow-up. She was seen in the office in May for follow-up after surveillance colonoscopy, notable for diverticulosis and small external hemorrhoids.  Regarding thrombocytopenia, recent RUQ ultrasound with elastography was negative for significant fibrosis.  She was recommended a repeat EGD with small bowel enteroscopy for retreatment of gastric and duodenal angioectasias in the setting of persistent iron deficiency anemia.  Regarding GERD and NSAID use, she was instructed to resume pantoprazole for both therapeutic relief and GI prophylaxis. Small bowel enteroscopy 6/12/2024:Solitary 1 mm nonbleeding gastric fundic AVM status post argon plasma coagulation, solitary 1 mm nonbleeding D1 AVM status post APC, mild chronic nonerosive chronic gastritis, small hiatal hernia.  Biopsies showed mild chronic inflammation and intestinal metaplasia, negative for H. pylori. Overall, she is doing fairly well from a GI standpoint.  She denies any overt red blood per rectum or melena.  Her reflux is managed with pantoprazole.  No abdominal pain, nausea or vomiting.  She experiences exacerbations of diarrhea a few times per week, which is typically self-limited resolving by lunchtime.  She otherwise has normal bowel movements.  She is not on a bowel regimen. Labs provides labs from hem/onc on 8/1/24 which show H/H 8.7/26.3, , Plt 75, WBC 7.5. She last required transfusion of PRBCs in Sept 2023.

## 2024-08-08 NOTE — HPI-OTHER HISTORIES
Colonoscopy 5/6/24: mild sigmoid colon diverticulosis, markedly tortuous, angulated, and fixed sigmoid colon, small external hemorrhoids. Repeat colonoscopy not recommended d/t age  RUQ US with elastography on 5/21/24 revealed mildly heterogenous hepatic echotexture,with liver fibrosis staging in the normal range.   Labs 4/25/2024:H/H 9/27.4, , PLT 68, WBC 8.2.  Labs 2/22/2024:H/H 8.6/25.8, , PLT 67, WBC 6.3. CMP unremarkable aside from glucose 149, BUN/creat 17/1.12. Iron 59, TIBC 268, iron sat 22. Ferritin 160. TSH 2.17.  Labs 3/2/21 WBC 11, HG 9.7, MCV 98, ; Iron 56, TIBC 279, Ferritin 172.  EGD 11/17/2020: Regular Z-line, medium size hiatal hernia, erythematous mucosa in the antrum status post biopsy, a few small nonbleeding angioectasias x5 in the stomach treated with bipolar cautery, normal examined duodenum status post biopsy. Duodenal biopsy demonstrated no significant abnormality. Stomach antrum and body biopsy demonstrated chronic inactive gastritis with histological changes suggestive of treated H. pylori gastritis. No evidence of H. pylori organisms or intestinal metaplasia, dysplasia, or malignancy. PPI effect.  Labs  11/11/2020: Serum protein electrophoresis normal.  Iron 36, TIBC 359, iron saturation 10%.  Vitamin B12 353, folate 12.  Hemoglobin A1c 6.3. 10/28/2020 WBC 6.8, hemoglobin 8.2, MCV 91, platelets 92; BUN 14, creatinine 1, glucose 128, albumin 4.5, alkaline phosphatase 54, AST 15, ALT 10, T bili 0.5, TSH 1.32, hemoglobin A1c 6.7  CT abdomen and pelvis 8/6/2019: no acute process. Normal liver, portal veins appear patent, normal spleen, normal gallbladder, moderate atherosclerosis. Capsule endoscopy 8/1/17 revealed mild scattered small bowel erythema, no intraluminal heme visualized, the capsule did remain in the stomach for the majority of the recording, the capsule remained in the small bowel at the conclusion of the study.     KUB was performed 8/29/17 which showed no evidence of retained capsule.  It did show some mild constipation and phleboliths in her pelvis.   Previous EGD 4/10/17 revealed localized gastritis otherwise normal stomach, normal esophagus, normal duodenum and normal proximal jejunum.  No evidence of angiodysplasias or ulcer disease.  Gastric biopsies revealed chemical gastropathy, negative for H. pylori and duodenal biopsies were unremarkable.     Previous colonoscopy 6/29/17 revealed 2 tubular adenomas ranging in size from 2-3 mm resected from the rectum and sigmoid colon, normal terminal ileum, few sigmoid diverticula with mild narrowing, otherwise normal colon and small internal hemorrhoids.  10/28/2020 WBC 6.8, hemoglobin 8.2, MCV 91, platelets 92; BUN 14, creatinine 1, glucose 128, albumin 4.5, alkaline phosphatase 54, AST 15, ALT 10, T bili 0.5, TSH 1.32, hemoglobin A1c 6.7